# Patient Record
Sex: FEMALE | Race: WHITE | Employment: OTHER | URBAN - NONMETROPOLITAN AREA
[De-identification: names, ages, dates, MRNs, and addresses within clinical notes are randomized per-mention and may not be internally consistent; named-entity substitution may affect disease eponyms.]

---

## 2017-01-31 ENCOUNTER — PROCEDURE VISIT (OUTPATIENT)
Dept: PHYSICAL MEDICINE AND REHAB | Age: 67
End: 2017-01-31

## 2017-01-31 DIAGNOSIS — M25.532 WRIST PAIN, LEFT: ICD-10-CM

## 2017-01-31 DIAGNOSIS — G56.03 BILATERAL CARPAL TUNNEL SYNDROME: Primary | ICD-10-CM

## 2017-01-31 DIAGNOSIS — R20.0 BILATERAL HAND NUMBNESS: ICD-10-CM

## 2017-01-31 PROCEDURE — 95886 MUSC TEST DONE W/N TEST COMP: CPT | Performed by: PSYCHIATRY & NEUROLOGY

## 2017-01-31 PROCEDURE — 95911 NRV CNDJ TEST 9-10 STUDIES: CPT | Performed by: PSYCHIATRY & NEUROLOGY

## 2019-02-27 ENCOUNTER — OFFICE VISIT (OUTPATIENT)
Dept: PHYSICAL MEDICINE AND REHAB | Age: 69
End: 2019-02-27
Payer: MEDICARE

## 2019-02-27 VITALS
HEIGHT: 63 IN | WEIGHT: 224 LBS | DIASTOLIC BLOOD PRESSURE: 83 MMHG | HEART RATE: 69 BPM | SYSTOLIC BLOOD PRESSURE: 140 MMHG | BODY MASS INDEX: 39.69 KG/M2

## 2019-02-27 DIAGNOSIS — M54.50 ACUTE LOW BACK PAIN WITHOUT SCIATICA, UNSPECIFIED BACK PAIN LATERALITY: Primary | ICD-10-CM

## 2019-02-27 PROCEDURE — 99203 OFFICE O/P NEW LOW 30 MIN: CPT | Performed by: PHYSICAL MEDICINE & REHABILITATION

## 2019-02-27 RX ORDER — TRAMADOL HYDROCHLORIDE 50 MG/1
50 TABLET ORAL EVERY 6 HOURS PRN
Qty: 20 TABLET | Refills: 0 | Status: SHIPPED | OUTPATIENT
Start: 2019-02-27 | End: 2019-03-04

## 2019-02-27 RX ORDER — METHYLPREDNISOLONE 4 MG/1
TABLET ORAL
Qty: 1 KIT | Refills: 0 | Status: SHIPPED | OUTPATIENT
Start: 2019-02-27 | End: 2019-03-05

## 2019-02-27 ASSESSMENT — ENCOUNTER SYMPTOMS
NAUSEA: 0
DIARRHEA: 1
PHOTOPHOBIA: 0
SHORTNESS OF BREATH: 1
ALLERGIC/IMMUNOLOGIC NEGATIVE: 1
TROUBLE SWALLOWING: 0
BACK PAIN: 1
CHEST TIGHTNESS: 0
SORE THROAT: 0

## 2019-12-28 ENCOUNTER — HOSPITAL ENCOUNTER (EMERGENCY)
Age: 69
Discharge: HOME | End: 2019-12-28
Payer: MEDICARE

## 2019-12-28 VITALS
TEMPERATURE: 98.1 F | BODY MASS INDEX: 43.41 KG/M2 | WEIGHT: 245 LBS | DIASTOLIC BLOOD PRESSURE: 81 MMHG | SYSTOLIC BLOOD PRESSURE: 137 MMHG | RESPIRATION RATE: 16 BRPM | HEART RATE: 56 BPM | OXYGEN SATURATION: 97 % | HEIGHT: 63 IN

## 2019-12-28 DIAGNOSIS — J40 BRONCHITIS: Primary | ICD-10-CM

## 2019-12-28 DIAGNOSIS — J44.1 COPD EXACERBATION (HCC): ICD-10-CM

## 2019-12-28 PROCEDURE — 99213 OFFICE O/P EST LOW 20 MIN: CPT

## 2019-12-28 PROCEDURE — 99214 OFFICE O/P EST MOD 30 MIN: CPT | Performed by: NURSE PRACTITIONER

## 2019-12-28 RX ORDER — METOPROLOL TARTRATE 50 MG/1
50 TABLET, FILM COATED ORAL 2 TIMES DAILY
COMMUNITY

## 2019-12-28 RX ORDER — ANTIARTHRITIC COMBINATION NO.2 900 MG
TABLET ORAL
COMMUNITY

## 2019-12-28 RX ORDER — ALBUTEROL SULFATE 2.5 MG/3ML
2.5 SOLUTION RESPIRATORY (INHALATION) EVERY 6 HOURS PRN
Qty: 60 VIAL | Refills: 0 | Status: SHIPPED | OUTPATIENT
Start: 2019-12-28

## 2019-12-28 RX ORDER — DOXYCYCLINE HYCLATE 100 MG
100 TABLET ORAL 2 TIMES DAILY
Qty: 20 TABLET | Refills: 0 | Status: SHIPPED | OUTPATIENT
Start: 2019-12-28 | End: 2020-01-07

## 2019-12-28 RX ORDER — FLUDROCORTISONE ACETATE 0.1 MG/1
0.1 TABLET ORAL DAILY
COMMUNITY

## 2019-12-28 RX ORDER — MULTIVITAMIN WITH IRON
250 TABLET ORAL DAILY
COMMUNITY

## 2019-12-28 RX ORDER — PREDNISONE 20 MG/1
TABLET ORAL
Qty: 18 TABLET | Refills: 0 | Status: SHIPPED | OUTPATIENT
Start: 2019-12-28 | End: 2020-01-07

## 2019-12-28 RX ORDER — BUPROPION HYDROCHLORIDE 150 MG/1
150 TABLET ORAL EVERY MORNING
COMMUNITY

## 2019-12-28 RX ORDER — DILTIAZEM HYDROCHLORIDE 60 MG/1
60 TABLET, FILM COATED ORAL 2 TIMES DAILY
COMMUNITY

## 2019-12-28 RX ORDER — ATORVASTATIN CALCIUM 40 MG/1
40 TABLET, FILM COATED ORAL DAILY
COMMUNITY

## 2019-12-28 RX ORDER — PANTOPRAZOLE SODIUM 40 MG/1
40 TABLET, DELAYED RELEASE ORAL DAILY
COMMUNITY

## 2019-12-28 RX ORDER — DESVENLAFAXINE 50 MG/1
50 TABLET, EXTENDED RELEASE ORAL DAILY
COMMUNITY

## 2019-12-28 RX ORDER — ASCORBIC ACID 500 MG
500 TABLET ORAL DAILY
COMMUNITY

## 2019-12-28 RX ORDER — PYRIDOXINE HCL (VITAMIN B6) 100 MG
50 TABLET ORAL DAILY
COMMUNITY

## 2019-12-28 RX ORDER — FUROSEMIDE 20 MG/1
20 TABLET ORAL DAILY
COMMUNITY

## 2019-12-28 RX ORDER — NEBULIZER ACCESSORIES
1 KIT MISCELLANEOUS DAILY PRN
Qty: 1 KIT | Refills: 0 | Status: SHIPPED | OUTPATIENT
Start: 2019-12-28 | End: 2020-01-27

## 2019-12-28 RX ORDER — LAMOTRIGINE 200 MG/1
200 TABLET ORAL DAILY
COMMUNITY

## 2019-12-28 RX ORDER — GUAIFENESIN AND CODEINE PHOSPHATE 100; 10 MG/5ML; MG/5ML
5 SOLUTION ORAL 3 TIMES DAILY PRN
Qty: 150 ML | Refills: 0 | Status: SHIPPED | OUTPATIENT
Start: 2019-12-28 | End: 2020-01-02

## 2019-12-28 ASSESSMENT — ENCOUNTER SYMPTOMS
CHEST TIGHTNESS: 1
EYE ITCHING: 0
ALLERGIC/IMMUNOLOGIC NEGATIVE: 1
ABDOMINAL PAIN: 0
COUGH: 1
SINUS CONGESTION: 0
ABDOMINAL DISTENTION: 0
WHEEZING: 0
EYE DISCHARGE: 0

## 2019-12-28 ASSESSMENT — PAIN DESCRIPTION - LOCATION: LOCATION: HEAD;THROAT

## 2019-12-28 ASSESSMENT — PAIN SCALES - GENERAL: PAINLEVEL_OUTOF10: 4

## 2020-02-23 ENCOUNTER — HOSPITAL ENCOUNTER (EMERGENCY)
Age: 70
Discharge: HOME | End: 2020-02-23
Attending: EMERGENCY MEDICINE
Payer: MEDICARE

## 2020-02-23 ENCOUNTER — APPOINTMENT (OUTPATIENT)
Dept: CT IMAGING | Age: 70
End: 2020-02-23
Payer: MEDICARE

## 2020-02-23 VITALS
DIASTOLIC BLOOD PRESSURE: 82 MMHG | HEIGHT: 62 IN | OXYGEN SATURATION: 97 % | SYSTOLIC BLOOD PRESSURE: 137 MMHG | TEMPERATURE: 98.2 F | RESPIRATION RATE: 20 BRPM | HEART RATE: 72 BPM | BODY MASS INDEX: 45.45 KG/M2 | WEIGHT: 247 LBS

## 2020-02-23 LAB
ANION GAP SERPL CALCULATED.3IONS-SCNC: 11 MEQ/L (ref 8–16)
BASOPHILS # BLD: 0.7 %
BASOPHILS ABSOLUTE: 0 THOU/MM3 (ref 0–0.1)
BUN BLDV-MCNC: 9 MG/DL (ref 7–22)
CALCIUM SERPL-MCNC: 9.3 MG/DL (ref 8.5–10.5)
CHLORIDE BLD-SCNC: 106 MEQ/L (ref 98–111)
CO2: 24 MEQ/L (ref 23–33)
CREAT SERPL-MCNC: 0.9 MG/DL (ref 0.4–1.2)
EOSINOPHIL # BLD: 2.1 %
EOSINOPHILS ABSOLUTE: 0.1 THOU/MM3 (ref 0–0.4)
ERYTHROCYTE [DISTWIDTH] IN BLOOD BY AUTOMATED COUNT: 13.9 % (ref 11.5–14.5)
ERYTHROCYTE [DISTWIDTH] IN BLOOD BY AUTOMATED COUNT: 50.9 FL (ref 35–45)
GFR SERPL CREATININE-BSD FRML MDRD: 62 ML/MIN/1.73M2
GLUCOSE BLD-MCNC: 99 MG/DL (ref 70–108)
HCT VFR BLD CALC: 42.4 % (ref 37–47)
HEMOGLOBIN: 13.1 GM/DL (ref 12–16)
IMMATURE GRANS (ABS): 0.01 THOU/MM3 (ref 0–0.07)
IMMATURE GRANULOCYTES: 0.2 %
LACTIC ACID, SEPSIS: 1.5 MMOL/L (ref 0.5–1.9)
LYMPHOCYTES # BLD: 24.1 %
LYMPHOCYTES ABSOLUTE: 1.3 THOU/MM3 (ref 1–4.8)
MCH RBC QN AUTO: 30.7 PG (ref 26–33)
MCHC RBC AUTO-ENTMCNC: 30.9 GM/DL (ref 32.2–35.5)
MCV RBC AUTO: 99.3 FL (ref 81–99)
MONOCYTES # BLD: 8.2 %
MONOCYTES ABSOLUTE: 0.5 THOU/MM3 (ref 0.4–1.3)
NUCLEATED RED BLOOD CELLS: 0 /100 WBC
OSMOLALITY CALCULATION: 280 MOSMOL/KG (ref 275–300)
PLATELET # BLD: 192 THOU/MM3 (ref 130–400)
PMV BLD AUTO: 10.1 FL (ref 9.4–12.4)
POTASSIUM REFLEX MAGNESIUM: 3.9 MEQ/L (ref 3.5–5.2)
RBC # BLD: 4.27 MILL/MM3 (ref 4.2–5.4)
SEG NEUTROPHILS: 64.7 %
SEGMENTED NEUTROPHILS ABSOLUTE COUNT: 3.6 THOU/MM3 (ref 1.8–7.7)
SODIUM BLD-SCNC: 141 MEQ/L (ref 135–145)
WBC # BLD: 5.6 THOU/MM3 (ref 4.8–10.8)

## 2020-02-23 PROCEDURE — 99283 EMERGENCY DEPT VISIT LOW MDM: CPT

## 2020-02-23 PROCEDURE — 70487 CT MAXILLOFACIAL W/DYE: CPT

## 2020-02-23 PROCEDURE — 85025 COMPLETE CBC W/AUTO DIFF WBC: CPT

## 2020-02-23 PROCEDURE — 83605 ASSAY OF LACTIC ACID: CPT

## 2020-02-23 PROCEDURE — 6370000000 HC RX 637 (ALT 250 FOR IP): Performed by: EMERGENCY MEDICINE

## 2020-02-23 PROCEDURE — 96365 THER/PROPH/DIAG IV INF INIT: CPT

## 2020-02-23 PROCEDURE — 99213 OFFICE O/P EST LOW 20 MIN: CPT | Performed by: NURSE PRACTITIONER

## 2020-02-23 PROCEDURE — 96366 THER/PROPH/DIAG IV INF ADDON: CPT

## 2020-02-23 PROCEDURE — 6360000004 HC RX CONTRAST MEDICATION: Performed by: EMERGENCY MEDICINE

## 2020-02-23 PROCEDURE — 87040 BLOOD CULTURE FOR BACTERIA: CPT

## 2020-02-23 PROCEDURE — 2580000003 HC RX 258: Performed by: EMERGENCY MEDICINE

## 2020-02-23 PROCEDURE — 36415 COLL VENOUS BLD VENIPUNCTURE: CPT

## 2020-02-23 PROCEDURE — 6360000002 HC RX W HCPCS: Performed by: EMERGENCY MEDICINE

## 2020-02-23 PROCEDURE — 80048 BASIC METABOLIC PNL TOTAL CA: CPT

## 2020-02-23 PROCEDURE — 99214 OFFICE O/P EST MOD 30 MIN: CPT

## 2020-02-23 RX ORDER — CEPHALEXIN 250 MG/1
500 CAPSULE ORAL ONCE
Status: COMPLETED | OUTPATIENT
Start: 2020-02-23 | End: 2020-02-23

## 2020-02-23 RX ORDER — SULFAMETHOXAZOLE AND TRIMETHOPRIM 800; 160 MG/1; MG/1
1 TABLET ORAL ONCE
Status: COMPLETED | OUTPATIENT
Start: 2020-02-23 | End: 2020-02-23

## 2020-02-23 RX ORDER — CEPHALEXIN 500 MG/1
500 CAPSULE ORAL 4 TIMES DAILY
Qty: 40 CAPSULE | Refills: 0 | Status: SHIPPED | OUTPATIENT
Start: 2020-02-23 | End: 2020-03-04

## 2020-02-23 RX ORDER — SULFAMETHOXAZOLE AND TRIMETHOPRIM 800; 160 MG/1; MG/1
1 TABLET ORAL 2 TIMES DAILY
Qty: 20 TABLET | Refills: 0 | Status: SHIPPED | OUTPATIENT
Start: 2020-02-23 | End: 2020-03-04

## 2020-02-23 RX ADMIN — IOPAMIDOL 65 ML: 755 INJECTION, SOLUTION INTRAVENOUS at 18:00

## 2020-02-23 RX ADMIN — NEOMYCIN SULFATE, POLYMYXIN B SULFATE AND HYDROCORTISONE 2 DROP: 3.5; 10000; 1 SUSPENSION OPHTHALMIC at 02:00

## 2020-02-23 RX ADMIN — CEPHALEXIN 500 MG: 250 CAPSULE ORAL at 21:52

## 2020-02-23 RX ADMIN — SULFAMETHOXAZOLE AND TRIMETHOPRIM 1 TABLET: 800; 160 TABLET ORAL at 21:52

## 2020-02-23 RX ADMIN — VANCOMYCIN HYDROCHLORIDE 1500 MG: 1 INJECTION, POWDER, LYOPHILIZED, FOR SOLUTION INTRAVENOUS at 17:15

## 2020-02-23 ASSESSMENT — ENCOUNTER SYMPTOMS
EYE PAIN: 1
FACIAL SWELLING: 1
SINUS PRESSURE: 1
SINUS PAIN: 1
EYE REDNESS: 1
NAUSEA: 0
EYE ITCHING: 0
RHINORRHEA: 0
EYE DISCHARGE: 1
PHOTOPHOBIA: 0
COUGH: 0
SHORTNESS OF BREATH: 0
ABDOMINAL PAIN: 0
DIARRHEA: 0
VOMITING: 0
EYE DISCHARGE: 0

## 2020-02-23 ASSESSMENT — VISUAL ACUITY
OS: 20/25
OU: 20/25
OD: 20/30

## 2020-02-23 ASSESSMENT — PAIN DESCRIPTION - ORIENTATION: ORIENTATION: RIGHT

## 2020-02-23 ASSESSMENT — PAIN DESCRIPTION - LOCATION: LOCATION: FACE;NOSE;EYE

## 2020-02-23 ASSESSMENT — PAIN DESCRIPTION - ONSET: ONSET: ON-GOING

## 2020-02-23 ASSESSMENT — PAIN DESCRIPTION - PAIN TYPE: TYPE: ACUTE PAIN

## 2020-02-23 ASSESSMENT — PAIN SCALES - GENERAL: PAINLEVEL_OUTOF10: 7

## 2020-02-23 ASSESSMENT — PAIN DESCRIPTION - DESCRIPTORS: DESCRIPTORS: THROBBING

## 2020-02-23 ASSESSMENT — PAIN DESCRIPTION - FREQUENCY: FREQUENCY: CONTINUOUS

## 2020-02-23 ASSESSMENT — PAIN DESCRIPTION - PROGRESSION: CLINICAL_PROGRESSION: NOT CHANGED

## 2020-02-23 NOTE — ED NOTES
Pt transferred to [address] [place] [address] ED for further evaluation and treatment via private car in stable cond.      Brian Hahn RN  02/23/20 3633

## 2020-02-23 NOTE — ED PROVIDER NOTES
easily. PAST MEDICAL HISTORY    has a past medical history of Anxiety, Atrial fibrillation ([address] Depression, Diverticulosis, Hypertension, Hypoglycemia, Malignant hyperthermia, MDRO (multiple drug resistant organisms) resistance, OA (osteoarthritis), Obsessive compulsive disorder, OCD (obsessive compulsive disorder), Sleep apnea, and Tachycardia. SURGICAL HISTORY    has a past surgical history that includes Gastric bypass surgery (2003); Colonoscopy (2005); Tonsillectomy (1957); Hysterectomy (1978); hernia repair (1999); Abdominoplasty (1999); Breast reduction surgery (2002); other surgical history (8/6/2013); and joint replacement. CURRENT MEDICATIONS       Previous Medications    ACETAMINOPHEN (TYLENOL) 325 MG TABLET    Take 650 mg by mouth every 6 hours as needed for Pain    ALBUTEROL (PROVENTIL) (2.5 MG/3ML) 0.083% NEBULIZER SOLUTION    Take 3 mLs by nebulization every 6 hours as needed for Wheezing or Shortness of Breath    APIXABAN (ELIQUIS) 5 MG TABS TABLET    Take 1 tablet by mouth 2 times daily    ATORVASTATIN (LIPITOR) 40 MG TABLET    Take 40 mg by mouth daily    BIOTIN 5000 MCG TABS    Take by mouth    BUPROPION (WELLBUTRIN XL) 150 MG EXTENDED RELEASE TABLET    Take 150 mg by mouth every morning    CALCIUM CITRATE (CITRACAL PO)    Take 200 mg by mouth 2 times daily    CHOLECALCIFEROL (D3 VITAMIN PO)    Take by mouth    CLOMIPRAMINE (ANAFRANIL) 75 MG CAPSULE    Take 75 mg by mouth nightly    DENOSUMAB (PROLIA) 60 MG/ML SOSY SC INJECTION    Inject 60 mg into the skin once    DESVENLAFAXINE SUCCINATE (PRISTIQ) 50 MG TB24 EXTENDED RELEASE TABLET    Take 50 mg by mouth daily    DILTIAZEM (CARDIZEM) 60 MG TABLET    Take 60 mg by mouth 2 times daily    FERROUS SULFATE 325 (65 FE) MG TABLET    Take 1 tablet by mouth daily (with breakfast).     FLUDROCORTISONE (FLORINEF) 0.1 MG TABLET    Take 0.1 mg by mouth daily    FUROSEMIDE (LASIX) 20 MG TABLET    Take 20 mg by mouth daily    LAMOTRIGINE (LAMICTAL) to person, place, and time. Motor: No abnormal muscle tone. Coordination: Coordination normal.      visual acuity 20/25 and 20/30 right 20/25 both. DIFFERENTIAL DIAGNOSIS:   Including but not limited to: Dacrocyst abscess, Nasal abscess with periorbital cellulilis, no signs of posterior chamber involvement. CT scan to ro orbital cellulitis     DIAGNOSTIC RESULTS     EKG: All EKG's are interpreted by the Emergency Department Physician who either signs or Co-signs this chart in the absence of a cardiologist.  EKG interpreted by Abimbola John, DO:    None    RADIOLOGY: non-plain film images(s) such as CT, Ultrasound and MRI are read by the radiologist.    No orders to display        LABS:   Labs Reviewed   CBC WITH AUTO DIFFERENTIAL - Abnormal; Notable for the following components:       Result Value    MCV 99.3 (*)     MCHC 30.9 (*)     RDW-SD 50.9 (*)     All other components within normal limits   GLOMERULAR FILTRATION RATE, ESTIMATED - Abnormal; Notable for the following components:    Est, Glom Filt Rate 62 (*)     All other components within normal limits   CULTURE, BLOOD 2   CULTURE, BLOOD 1   BASIC METABOLIC PANEL W/ REFLEX TO MG FOR LOW K   LACTATE, SEPSIS   ANION GAP   OSMOLALITY       EMERGENCY DEPARTMENT COURSE:   Vitals:    Vitals:    02/23/20 1815 02/23/20 1929 02/23/20 2049 02/23/20 2153   BP: (!) 145/94 (!) 141/97 (!) 161/98 137/82   Pulse: 74 69 66 72   Resp: 20 18 18 20   Temp:       SpO2: 100% 98% 98% 97%   Weight:       Height:           4:48 PM: The patient was seen and evaluated. 17:30 PM: Ophthalmologist has been contacted     19:46: Multiple attempts to contact on call ophthalmologist. Has been unsuccessful. Discussed case with Dr. Edvin Mattson Ophthalmology- [facility] place patient on Staph coverage bactrim due to allergies, and topical eye drops. Patient to follow-up in the office in the AM for possible drainage and further evaluation.      MDM:    I estimate there is LOW risk for CORNEAL ULCER, PENETRATING GLOBE INJURY, CENTRAL RETINAL ARTERY OCCLUSION, ACUTE GLAUCOMA, RETINAL VEIN THROMBOSIS, ORBITAL CELLULITIS, SINUS VENOUS THROMBOSIS, OR HERPETIC KERATITIS, thus I consider the discharge disposition reasonable. Patient with abscess along medial canthus firm and indurated with periorbital cellulitis and conjunctival injection about the Medial eye. She has no vision changes. No evidence of posterior chamber involvement. She does not have symptoms of deep space orbital infection. Her head and face CT with contrast does not demonstrate evidence of an orbital cellulitis. I discussed this case at length with Dr. Sherryle Haller and he rec OP staph coverage abx and a topical Jorge/Ply steroid drop. Due to patient allergy she was given bactrim which is not without risk in her age group. I discussed the possible adverse side effects. The patient and I have discussed the diagnosis and risks, and we agree with discharging home to follow-up with their primary doctor. We also discussed returning to the Emergency Department immediately if new or worsening symptoms occur. We have discussed the symptoms which are most concerning (e.g., loss or vision, severe eye pain, vomiting, fever) that necessitate immediate return. CRITICAL CARE:   none     CONSULTS:  Dr. Sherryle Haller opthamology    PROCEDURES:  None     FINAL IMPRESSION      1. Periorbital cellulitis of right eye    2. Dacrocystitis, right    3. Conjunctivitis of right eye, unspecified conjunctivitis type          DISPOSITION/PLAN   Home     PATIENT REFERRED TO:  Nica Ji MD  [address] [place] [id number]  [phone]    Go in 1 day  Call office in the AM and go to his office tomorrow for an office appointment.       DISCHARGE MEDICATIONS:  Discharge Medication List as of 2/23/2020  9:56 PM      START taking these medications    Details   cephALEXin (KEFLEX) 500 MG capsule Take 1 capsule by mouth 4 times daily for 10 days,

## 2020-02-23 NOTE — ED NOTES
Patient updated on plan of care. Call light in reach.  Vitals as charted      Carlos Molina RN  02/23/20 9908

## 2020-02-23 NOTE — ED PROVIDER NOTES
(MULTIVITAMIN ADULT PO)    Take by mouth    PANTOPRAZOLE (PROTONIX) 40 MG TABLET    Take 40 mg by mouth daily    PYRIDOXINE (B-6) 100 MG TABLET    Take 50 mg by mouth daily    RESPIRATORY THERAPY SUPPLIES (NEBULIZER/TUBING/MOUTHPIECE) KIT    1 kit by Does not apply route daily as needed (for nebulizer administration)    SERTRALINE (ZOLOFT) 100 MG TABLET    Take 100 mg by mouth daily     SOTALOL (BETAPACE) 80 MG TABLET    Take 80 mg by mouth 2 times daily    TOPIRAMATE (TOPAMAX) 100 MG TABLET    Take 100 mg by mouth 2 times daily. TRAZODONE (DESYREL) 150 MG TABLET    Take 300 mg by mouth nightly    VITAMIN C (ASCORBIC ACID) 500 MG TABLET    Take 500 mg by mouth daily       ALLERGIES     Patient is is allergic to penicillins and streptomycin. Patients   There is no immunization history on file for this patient. FAMILY HISTORY     Patient's family history includes Cancer in her brother, father, mother, and son; Diabetes in her mother; Heart Disease (age of onset: 54) in her father and sister. SOCIAL HISTORY     Patient  reports that she quit smoking about 40 years ago. Her smoking use included cigarettes. She has a 7.50 pack-year smoking history. She has never used smokeless tobacco. She reports that she does not drink alcohol or use drugs. PHYSICAL EXAM     ED TRIAGE VITALS  BP: (!) 140/69, Temp: 98.2 °F (36.8 °C), Pulse: 82, Resp: 20, SpO2: 94 %,Estimated body mass index is 45.18 kg/m² as calculated from the following:    Height as of this encounter: 5' 2\" (1.575 m). Weight as of this encounter: 247 lb (112 kg). ,No LMP recorded. Patient has had a hysterectomy. Physical Exam  Constitutional:       General: She is not in acute distress. Appearance: Normal appearance. She is not ill-appearing, toxic-appearing or diaphoretic. HENT:      Nose: Nose normal.      Mouth/Throat:      Mouth: Mucous membranes are moist.      Pharynx: No oropharyngeal exudate or posterior oropharyngeal erythema. Eyes:      General: Scleral icterus present. No allergic shiner or visual field deficit. Right eye: Discharge present. No foreign body or hordeolum. Left eye: No foreign body, discharge or hordeolum. Cardiovascular:      Pulses: Normal pulses. Pulmonary:      Effort: Pulmonary effort is normal. No respiratory distress. Breath sounds: No stridor. No wheezing, rhonchi or rales. Chest:      Chest wall: No tenderness. Musculoskeletal: Normal range of motion. Skin:     General: Skin is warm. Findings: No erythema or rash. Neurological:      General: No focal deficit present. Mental Status: She is oriented to person, place, and time. Sensory: No sensory deficit. Psychiatric:         Mood and Affect: Mood normal.         Behavior: Behavior normal.         Thought Content: Thought content normal.         Judgment: Judgment normal.         DIAGNOSTIC RESULTS     Labs:No results found for this visit on 02/23/20. IMAGING:    No orders to display     URGENT CARE COURSE:     Vitals:    02/23/20 1529   BP: (!) 140/69   Pulse: 82   Resp: 20   Temp: 98.2 °F (36.8 °C)   SpO2: 94%   Weight: 247 lb (112 kg)   Height: 5' 2\" (1.575 m)       Medications - No data to display         PROCEDURES:  None    FINAL IMPRESSION      1. Cellulitis of face          DISPOSITION/ PLAN   Patient transferred to [facility] ER for further evaluation of cellulitis to right upper nasal bridge that is spreading to right eye. Patient refuses to go by squad, and states that she feels comfortable driving herself.         PATIENT REFERRED TO:  Sara Diaz, [address] [facility] [id number]      DISCHARGE MEDICATIONS:  New Prescriptions    No medications on file       Discontinued Medications    No medications on file       Current Discharge Medication List          FIONA Andrew NP    (Please note that portions of this note were completed with a voice recognition program. Efforts were made to edit the dictations but occasionally words are mis-transcribed.)         FIONA Piña NP  02/23/20 5086

## 2020-02-24 NOTE — ED NOTES
Pt completed visual eye acuity. Pt went to bathroom with walker. Pt VS reassessed. Respirations regular.  Will continue to monitor      Monica Kowalski RN  02/23/20 2052

## 2020-02-24 NOTE — ED NOTES
ED nurse-to-nurse bedside report    Chief Complaint   Patient presents with    Wound Infection     right side of the nose     Facial Swelling     right eye very red and swollen sclera is blood red      LOC: alert and orientated to name, place, date  Vital signs   Vitals:    02/23/20 1529 02/23/20 1815 02/23/20 1929   BP: (!) 140/69 (!) 145/94 (!) 141/97   Pulse: 82 74 69   Resp: 20 20 18   Temp: 98.2 °F (36.8 °C)     SpO2: 94% 100% 98%   Weight: 247 lb (112 kg)     Height: 5' 2\" (1.575 m)        Pain:    Pain Interventions: none   Pain Goal: 4/10  Oxygen: No    Current needs required pending discharge    Telemetry: No  LDAs:   Peripheral IV 08/06/13 Right Hand (Active)       Peripheral IV 02/23/20 Left Antecubital (Active)   Line Status Normal saline locked 2/23/2020  5:01 PM     Continuous Infusions:   Mobility: Independent  Coughlin Fall Risk Score: No flowsheet data found.   Fall Interventions: side rails x2  Report given to: [address], RN  02/23/20 7920

## 2020-02-29 LAB
BLOOD CULTURE, ROUTINE: NORMAL
BLOOD CULTURE, ROUTINE: NORMAL

## 2020-09-27 ENCOUNTER — HOSPITAL ENCOUNTER (EMERGENCY)
Age: 70
Discharge: HOME | End: 2020-09-27
Payer: MEDICARE

## 2020-09-27 ENCOUNTER — APPOINTMENT (OUTPATIENT)
Dept: GENERAL RADIOLOGY | Age: 70
End: 2020-09-27
Payer: MEDICARE

## 2020-09-27 VITALS
SYSTOLIC BLOOD PRESSURE: 129 MMHG | TEMPERATURE: 99 F | HEART RATE: 76 BPM | RESPIRATION RATE: 18 BRPM | WEIGHT: 240 LBS | HEIGHT: 63 IN | BODY MASS INDEX: 42.52 KG/M2 | DIASTOLIC BLOOD PRESSURE: 106 MMHG | OXYGEN SATURATION: 98 %

## 2020-09-27 PROCEDURE — 73564 X-RAY EXAM KNEE 4 OR MORE: CPT

## 2020-09-27 PROCEDURE — 99283 EMERGENCY DEPT VISIT LOW MDM: CPT

## 2020-09-27 PROCEDURE — 6370000000 HC RX 637 (ALT 250 FOR IP): Performed by: PHYSICIAN ASSISTANT

## 2020-09-27 PROCEDURE — 99282 EMERGENCY DEPT VISIT SF MDM: CPT

## 2020-09-27 RX ORDER — HYDROCODONE BITARTRATE AND ACETAMINOPHEN 5; 325 MG/1; MG/1
1 TABLET ORAL ONCE
Status: COMPLETED | OUTPATIENT
Start: 2020-09-27 | End: 2020-09-27

## 2020-09-27 RX ORDER — HYDROCODONE BITARTRATE AND ACETAMINOPHEN 5; 325 MG/1; MG/1
1 TABLET ORAL EVERY 6 HOURS PRN
Qty: 10 TABLET | Refills: 0 | Status: SHIPPED | OUTPATIENT
Start: 2020-09-27 | End: 2020-09-30

## 2020-09-27 RX ADMIN — HYDROCODONE BITARTRATE AND ACETAMINOPHEN 1 TABLET: 5; 325 TABLET ORAL at 19:35

## 2020-09-27 ASSESSMENT — ENCOUNTER SYMPTOMS
VOMITING: 0
NAUSEA: 0
RHINORRHEA: 0
SHORTNESS OF BREATH: 0
PHOTOPHOBIA: 0
BACK PAIN: 0

## 2020-09-27 ASSESSMENT — PAIN SCALES - GENERAL: PAINLEVEL_OUTOF10: 6

## 2020-09-27 NOTE — ED PROVIDER NOTES
[facility] EMERGENCY DEPT      CHIEF COMPLAINT       Chief Complaint   Patient presents with    Knee Pain     rt       Nurses Notes reviewed and I agree except as noted in the HPI. HISTORY OF PRESENT ILLNESS    Mona Crocker is a 79 y.o. female who presents for evaluation of right knee injury. Today patient lost her balance and fell landing on her right knee. She denies head injury or loss of consciousness. She is able to ambulate but only with pain. Her symptoms of pain and swelling worsened throughout the day with the addition of paresthesias, prompting her to come to the ER. She describes the pain as shooting, burning, and sharp. It is worse with ambulation better with rest.  She is on Eliquis and has a history of frequent falls. She reports she fell out of bed yesterday hitting her arm on an end table. REVIEW OF SYSTEMS     Review of Systems   Constitutional: Negative for fever. HENT: Negative for rhinorrhea. Eyes: Negative for photophobia. Respiratory: Negative for shortness of breath. Cardiovascular: Negative for chest pain. Gastrointestinal: Negative for nausea and vomiting. Musculoskeletal: Positive for arthralgias and myalgias. Negative for back pain and neck pain. Skin: Negative for rash and wound. Neurological: Negative for weakness and numbness. Psychiatric/Behavioral: Negative for confusion. PAST MEDICAL HISTORY    has a past medical history of Anxiety, Atrial fibrillation ([address] Depression, Diverticulosis, Hypertension, Hypoglycemia, Malignant hyperthermia, MDRO (multiple drug resistant organisms) resistance, OA (osteoarthritis), Obsessive compulsive disorder, OCD (obsessive compulsive disorder), Sleep apnea, and Tachycardia. SURGICAL HISTORY      has a past surgical history that includes Gastric bypass surgery (2003); Colonoscopy (2005); Tonsillectomy (1957); Hysterectomy (1978); hernia repair (1999); Abdominoplasty (1999);  Breast reduction surgery (2002); other surgical history (8/6/2013); and joint replacement. CURRENT MEDICATIONS       Previous Medications    ACETAMINOPHEN (TYLENOL) 325 MG TABLET    Take 650 mg by mouth every 6 hours as needed for Pain    ALBUTEROL (PROVENTIL) (2.5 MG/3ML) 0.083% NEBULIZER SOLUTION    Take 3 mLs by nebulization every 6 hours as needed for Wheezing or Shortness of Breath    APIXABAN (ELIQUIS) 5 MG TABS TABLET    Take 1 tablet by mouth 2 times daily    ATORVASTATIN (LIPITOR) 40 MG TABLET    Take 40 mg by mouth daily    BIOTIN 5000 MCG TABS    Take by mouth    BUPROPION (WELLBUTRIN XL) 150 MG EXTENDED RELEASE TABLET    Take 150 mg by mouth every morning    CALCIUM CITRATE (CITRACAL PO)    Take 200 mg by mouth 2 times daily    CHOLECALCIFEROL (D3 VITAMIN PO)    Take by mouth    CLOMIPRAMINE (ANAFRANIL) 75 MG CAPSULE    Take 75 mg by mouth nightly    DENOSUMAB (PROLIA) 60 MG/ML SOSY SC INJECTION    Inject 60 mg into the skin once    DESVENLAFAXINE SUCCINATE (PRISTIQ) 50 MG TB24 EXTENDED RELEASE TABLET    Take 50 mg by mouth daily    DILTIAZEM (CARDIZEM) 60 MG TABLET    Take 60 mg by mouth 2 times daily    FERROUS SULFATE 325 (65 FE) MG TABLET    Take 1 tablet by mouth daily (with breakfast).     FLUDROCORTISONE (FLORINEF) 0.1 MG TABLET    Take 0.1 mg by mouth daily    FUROSEMIDE (LASIX) 20 MG TABLET    Take 20 mg by mouth daily    LAMOTRIGINE (LAMICTAL) 200 MG TABLET    Take 200 mg by mouth daily    LISINOPRIL (PRINIVIL;ZESTRIL) 10 MG TABLET    Take 10 mg by mouth nightly    LORATADINE (CLARITIN) 10 MG TABLET    Take 1 tablet by mouth daily    MAGNESIUM (MAGNESIUM-OXIDE) 250 MG TABS TABLET    Take 250 mg by mouth daily    METOPROLOL TARTRATE (LOPRESSOR) 50 MG TABLET    Take 50 mg by mouth 2 times daily    MULTIPLE VITAMINS-MINERALS (MULTIVITAMIN ADULT PO)    Take by mouth    PANTOPRAZOLE (PROTONIX) 40 MG TABLET    Take 40 mg by mouth daily    PYRIDOXINE (B-6) 100 MG TABLET    Take 50 mg by mouth daily    RESPIRATORY THERAPY rigidity. Trachea: Trachea normal. No tracheal deviation. Cardiovascular:      Rate and Rhythm: Normal rate and regular rhythm. Pulses:           Dorsalis pedis pulses are 2+ on the right side and 2+ on the left side. Posterior tibial pulses are 2+ on the right side and 2+ on the left side. Pulmonary:      Effort: Pulmonary effort is normal. No tachypnea. Abdominal:      General: There is no distension. Palpations: Abdomen is soft. Musculoskeletal:      Right knee: She exhibits swelling and ecchymosis. She exhibits normal patellar mobility. Tenderness found. Medial joint line and lateral joint line tenderness noted. Right forearm: She exhibits tenderness and swelling. She exhibits no bony tenderness. Right lower leg: Normal.      Comments: Focal hematoma to right forearm   Skin:     General: Skin is warm and dry. Coloration: Skin is not pale. Findings: No rash. Neurological:      Mental Status: She is alert. GCS: GCS eye subscore is 4. GCS verbal subscore is 5. GCS motor subscore is 6. Sensory: No sensory deficit. Gait: Gait normal.   Psychiatric:         Speech: Speech normal.         Behavior: Behavior normal. Behavior is cooperative. Thought Content: Thought content normal.         DIFFERENTIAL DIAGNOSIS:   Including but not limited to: Knee contusion, internal derangement, fracture, coagulopathy    DIAGNOSTIC RESULTS     EKG: All EKG's are interpreted by [facility] who either signs or Co-signs this chart in the absence of a cardiologist.  None    RADIOLOGY: non-plain film images(s) such as CT,Ultrasound and MRI are read by the radiologist.  Plain radiographic images are visualized and preliminarily interpreted by the emergency physician unless otherwise stated below. XR KNEE RIGHT (MIN 4 VIEWS)   Final Result      No fracture or dislocation.       Final report electronically signed by Dr. Sharan Roque on 9/27/2020 7:28 PM          LABS:   Labs Reviewed - No data to display    EMERGENCY DEPARTMENT COURSE:   Vitals:    Vitals:    09/27/20 1847   BP: (!) 129/106   Pulse: 76   Resp: 18   Temp: 99 °F (37.2 °C)   TempSrc: Oral   SpO2: 98%   Weight: 240 lb (108.9 kg)   Height: 5' 3\" (1.6 m)       MDM:  The patient was seen and evaluated by me in the intake area. Vital signs were reviewed. Physical exam revealed swelling and ecchymosis to the right knee with tenderness along the medial joint line more so than the lateral.  The patient was neurovascularly intact and demonstrated good range of motion. Appropriate testing was ordered. Results were reviewed by me upon completion. Results showed no fracture. Results were discussed with the patient and discharge plan was discussed. I have given the patient strict written and verbal instructions about care at home, follow-up, and signs and symptoms of worsening of condition and they did verbalize understanding. CRITICAL CARE:   None    CONSULTS:  None    PROCEDURES:  None    FINAL IMPRESSION      1. Contusion of right knee, initial encounter    2. Fall on same level from slipping, tripping or stumbling, initial encounter          DISPOSITION/PLAN     1. Contusion of right knee, initial encounter    2. Fall on same level from slipping, tripping or stumbling, initial encounter        PATIENT REFERRED TO:  Boni Maldonado 89  [address] [facility] [phone]  Schedule an appointment as soon as possible for a visit         DISCHARGE MEDICATIONS:  New Prescriptions    HYDROCODONE-ACETAMINOPHEN (NORCO) 5-325 MG PER TABLET    Take 1 tablet by mouth every 6 hours as needed for Pain for up to 3 days. Intended supply: 3 days.  Take lowest dose possible to manage pain       (Please note that portions of this note were completed with a voice recognition program.  Efforts were made to edit the dictations but occasionally words are mis-transcribed.)    Will Enamorado Sadie Frye, [place] 09/27/20 7:37 PM    GEREMIAS Stevenson, [place]  09/27/20 7674

## 2020-09-27 NOTE — ED TRIAGE NOTES
Pt to er. Pt states she fell this morning after her grandson was running after her and lost her balance. States she hit her rt knee on the floor. Pt states she uses a walker and it is becoming harder to walk. States burning pain. Pt ambuklatory in to ER.

## 2021-06-29 ENCOUNTER — TELEPHONE (OUTPATIENT)
Dept: BARIATRICS/WEIGHT MGMT | Age: 71
End: 2021-06-29

## 2021-06-29 NOTE — TELEPHONE ENCOUNTER
We received a referral from you PCP- unfortunately our age cut off for bariatric surgery is 76. We are happy to offer you medially supervised weight mgmt. Pt voiced understanding. She may call us back to get more information.     PCP office was notified of this information